# Patient Record
Sex: FEMALE | Race: BLACK OR AFRICAN AMERICAN | NOT HISPANIC OR LATINO | Employment: OTHER | URBAN - METROPOLITAN AREA
[De-identification: names, ages, dates, MRNs, and addresses within clinical notes are randomized per-mention and may not be internally consistent; named-entity substitution may affect disease eponyms.]

---

## 2024-09-23 ENCOUNTER — APPOINTMENT (OUTPATIENT)
Dept: PHYSICAL THERAPY | Facility: CLINIC | Age: 64
End: 2024-09-23
Payer: MEDICARE

## 2024-09-30 ENCOUNTER — EVALUATION (OUTPATIENT)
Dept: PHYSICAL THERAPY | Facility: CLINIC | Age: 64
End: 2024-09-30
Payer: MEDICARE

## 2024-09-30 DIAGNOSIS — M51.34 DDD (DEGENERATIVE DISC DISEASE), THORACIC: Primary | ICD-10-CM

## 2024-09-30 PROCEDURE — 97161 PT EVAL LOW COMPLEX 20 MIN: CPT | Mod: GP | Performed by: PHYSICAL THERAPIST

## 2024-09-30 PROCEDURE — 97535 SELF CARE MNGMENT TRAINING: CPT | Mod: GP | Performed by: PHYSICAL THERAPIST

## 2024-09-30 ASSESSMENT — ENCOUNTER SYMPTOMS
LOSS OF SENSATION IN FEET: 0
OCCASIONAL FEELINGS OF UNSTEADINESS: 0
DEPRESSION: 0

## 2024-09-30 ASSESSMENT — COLUMBIA-SUICIDE SEVERITY RATING SCALE - C-SSRS
6. HAVE YOU EVER DONE ANYTHING, STARTED TO DO ANYTHING, OR PREPARED TO DO ANYTHING TO END YOUR LIFE?: NO
2. HAVE YOU ACTUALLY HAD ANY THOUGHTS OF KILLING YOURSELF?: NO
1. IN THE PAST MONTH, HAVE YOU WISHED YOU WERE DEAD OR WISHED YOU COULD GO TO SLEEP AND NOT WAKE UP?: NO

## 2024-09-30 NOTE — PROGRESS NOTES
Physical Therapy Evaluation    Patient Name: Agustina Leal  MRN: [id number]  Today's Date: 9/30/2024  Referred by: Dr. Orellana  Visit: 1/MN      Time Calculation  Start Time: 1145  Stop Time: 1225  Time Calculation (min): 40 min  Diagnosis:  1. 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  Pain:  0-10/10 mid thoracic spine  Home Living:  No issues  Prior level of function:  History of neck and low back/hip issues  Personal Factors Impacting Care:  Right shoulder RCT    OBJECTIVE:  ROM: full shoulder and thoracic ROM  + TTP mid thoracic spine and paraspinals  Shoulder strength: right 4-/5, left 5/5  Functional Outcome Measure:  Oswsestry 46%    ASSESSMENT:  Patient presents with signs of aggravated thoracic spine degeneration, weakness noted and patient now with bigger demand on musculature with new job duties, will benefit from progressive strengthening program to address deficits and lessen stress on thoracic spine.    Patient presents with the following deficits/problems that indicate the need for skilled PT: thoracic pain, UE weakness.    Low complexity due to patient's clinical presentation being stable and uncomplicated by any significant comorbidities that may affect rehab tolerance and progression.     Clinical presentation:  Stable and/or uncomplicated characteristics    TREATMENT:  Initial evaluation performed followed by discussion of findings and instruction in HEP.    PATIENT EDUCATION:  Access Code: [email or web address]  URL: [email or web address]  Date:  09/30/2024  Prepared by: Stephen Jones    Exercises  - Standing Row with Anchored Resistance  - 1 x daily - 7 x weekly - 3 sets - 15 reps  - Shoulder extension with resistance - Neutral  - 1 x daily - 7 x weekly - 3 sets - 15 reps  - Bird Dog  - 1 x daily - 7 x weekly - 3 sets - 10 reps  - Standing Shoulder Flexion to 90 Degrees with Dumbbells  - 1 x daily - 7 x weekly - 3 sets - 15 reps    PLAN:   HEP daily, will reach out via email with updates     Rehab potential:  Good  Plan of care agreement  Patient    GOALS:  Active       PT Problem       Independent with thoracic and shoulder strengthening program       Start:  09/30/24    Expected End:  11/25/24            Patient Stated Goal: lessen thoracic pain       Start:  09/30/24    Expected End:  11/25/24

## 2024-10-14 ENCOUNTER — APPOINTMENT (OUTPATIENT)
Dept: PHYSICAL THERAPY | Facility: CLINIC | Age: 64
End: 2024-10-14
Payer: MEDICARE

## 2024-10-16 ENCOUNTER — OFFICE VISIT (OUTPATIENT)
Dept: URGENT CARE | Age: 64
End: 2024-10-16
Payer: MEDICARE

## 2024-10-16 VITALS
WEIGHT: 179 LBS | TEMPERATURE: 97.7 F | RESPIRATION RATE: 14 BRPM | HEART RATE: 65 BPM | OXYGEN SATURATION: 96 % | DIASTOLIC BLOOD PRESSURE: 81 MMHG | SYSTOLIC BLOOD PRESSURE: 123 MMHG | BODY MASS INDEX: 33.82 KG/M2

## 2024-10-16 DIAGNOSIS — J01.00 ACUTE NON-RECURRENT MAXILLARY SINUSITIS: Primary | ICD-10-CM

## 2024-10-16 PROCEDURE — 99070 SPECIAL SUPPLIES PHYS/QHP: CPT | Performed by: PHYSICIAN ASSISTANT

## 2024-10-16 PROCEDURE — 99213 OFFICE O/P EST LOW 20 MIN: CPT | Performed by: PHYSICIAN ASSISTANT

## 2024-10-16 RX ORDER — AMOXICILLIN AND CLAVULANATE POTASSIUM 875; 125 MG/1; MG/1
1 TABLET, FILM COATED ORAL 2 TIMES DAILY
Qty: 20 TABLET | Refills: 0 | Status: SHIPPED | OUTPATIENT
Start: 2024-10-16 | End: 2024-10-26

## 2024-10-16 NOTE — PROGRESS NOTES
Subjective   Patient ID: Agustina Leal is a 63 y.o. female. They present today with a chief complaint of No chief complaint on file..    History of Present Illness  HPI  Patient presents with sinus symptoms for the past 14 days.  Complains of facial pressure, congestion, aches.  No fever.  No purulent rhinorrhea.  None remitting course.  Does have associated right ear fullness.    Past Medical History  Allergies as of 10/16/2024    (Not on File)       (Not in a hospital admission)             Past Medical History:   Diagnosis Date    Acute maxillary sinusitis, unspecified 06/08/2022    Acute maxillary sinusitis    Contusion of unspecified hand, initial encounter 07/06/2018    Hand contusion    Encounter for screening for depression 07/26/2022    Standardized adult depression screening tool completed    Personal history of other diseases of the respiratory system 12/26/2017    History of acute sinusitis    Personal history of other diseases of the respiratory system 03/01/2022    History of acute bronchitis with bronchospasm       Past Surgical History:   Procedure Laterality Date    HAND SURGERY  05/22/2018    Hand Surgery                                                                                                                                                                  Review of Systems  Review of Systems   Review of systems: A complete review of systems was done, and is as stated in the history of present illness, is otherwise negative or not pertinent to the complaint.       Objective    There were no vitals filed for this visit.  No LMP recorded.    Physical Exam  NAD. Well appearing    MMM   PERRLA   no icterus   TMs clear bilat   Oropharynx clear of exudate or tonsillar swelling/exudate   + right maxillary sinus ttp   neck supple. TIMMY. No LAD   no resp distress. Lungs CTAB without w/r/r   RRR. No m/r/g   Abd; Soft. NTTP   BURROWS x 4. MS 5/5   Skin; warm without rashes   pulses 2+ throughout    neuro intact with normal sensation and motor   psych a&o x 3       Procedures    Point of Care Test & Imaging Results from this visit    Assessment/Plan   Allergies, medications, history, and pertinent labs/EKGs/Imaging reviewed by Mike Harper PA-C.     Medical Decision Making  -ABSI  -Augmentin course; previously tolerated  -fu pcp 2-3 days if not improved  -all ?s answered     Orders and Diagnoses  There are no diagnoses linked to this encounter.

## 2024-12-05 ENCOUNTER — OFFICE VISIT (OUTPATIENT)
Dept: URGENT CARE | Age: 64
End: 2024-12-05
Payer: MEDICARE

## 2024-12-05 VITALS — SYSTOLIC BLOOD PRESSURE: 167 MMHG | DIASTOLIC BLOOD PRESSURE: 91 MMHG | HEART RATE: 86 BPM | TEMPERATURE: 97.9 F

## 2024-12-05 DIAGNOSIS — M54.9 RIGHT-SIDED BACK PAIN, UNSPECIFIED BACK LOCATION, UNSPECIFIED CHRONICITY: Primary | ICD-10-CM

## 2024-12-05 LAB
POC APPEARANCE, URINE: CLEAR
POC BILIRUBIN, URINE: NEGATIVE
POC BLOOD, URINE: ABNORMAL
POC COLOR, URINE: YELLOW
POC GLUCOSE, URINE: NEGATIVE MG/DL
POC KETONES, URINE: NEGATIVE MG/DL
POC LEUKOCYTES, URINE: NEGATIVE
POC NITRITE,URINE: NEGATIVE
POC PH, URINE: 6 PH
POC PROTEIN, URINE: NEGATIVE MG/DL
POC SPECIFIC GRAVITY, URINE: >=1.03

## 2024-12-05 RX ORDER — METHOCARBAMOL 500 MG/1
500 TABLET, FILM COATED ORAL 4 TIMES DAILY PRN
Qty: 40 TABLET | Refills: 0 | Status: SHIPPED | OUTPATIENT
Start: 2024-12-05 | End: 2025-02-03

## 2024-12-05 NOTE — PROGRESS NOTES
Subjective   Patient ID: Agustina Leal is a 64 y.o. female. They present today with a chief complaint of Back Pain (R side pain x2 days. Hx kidney stone Mar 2024- Sx removal).    History of Present Illness  65 yo female coming in for right side pain for the last 2 days. 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 She states when she pushes on the area of pain she does get some relief. She denies any urinary issues but states she has had a kidney stone in the past. She denies any other complaints.     Past Medical History  Allergies as of 12/05/2024 - Reviewed 12/05/2024   Allergen Reaction Noted    Bacitracin Rash 02/18/2013    Celecoxib GI Upset 11/27/2013    Cephalexin Other and Rash 04/23/2010    Codeine GI Upset, Other, and Nausea/vomiting 01/22/2009    Fluoxetine Rash 05/15/2003    Meloxicam Hives, Swelling, and Unknown 11/27/2013    Neomycin Rash 02/18/2013    Nickel Hives 12/27/2012    Tramadol Hives and Unknown 11/27/2013    Lincomycin GI Upset 10/16/2024    Oxycodone-acetaminophen Hives 10/16/2024    Tamsulosin Unknown 03/27/2024    Latex Rash 10/16/2024       (Not in a hospital admission)       Past Medical History:   Diagnosis Date    Acute maxillary sinusitis, unspecified 06/08/2022    Acute maxillary sinusitis    Contusion of unspecified hand, initial encounter 07/06/2018    Hand contusion    Encounter for screening for depression 07/26/2022    Standardized adult depression screening tool completed    Personal history of other diseases of the respiratory system 12/26/2017    History of acute sinusitis    Personal history of other diseases of the respiratory system 03/01/2022    History of acute bronchitis with bronchospasm       Past Surgical History:   Procedure Laterality Date    HAND SURGERY  05/22/2018    Hand Surgery                                                                                                                                                                   Review of Systems  Review of Systems:  General: No weight loss, fatigue, anorexia, insomnia, fever, chills.  Cardiac: No chest pain, palpitations, syncope, near syncope.  Pulmonary:  No shortness of breath, cough, hemoptysis  Heme/lymph: No swollen glands, fever, bleeding  GI: Positive right flank abdominal pain, no change in bowel habits, melena, hematemesis, hematochezia, nausea, vomiting, or diarrhea  : No discharge, dysuria, frequency, urgency, hematuria  Neuro: No numbness, tingling, headaches                                 Objective    Vitals:    12/05/24 0921   BP: (!) 167/91   BP Location: Left arm   Patient Position: Sitting   BP Cuff Size: Adult   Pulse: 86   Temp: 36.6 °C (97.9 °F)   TempSrc: Oral     No LMP recorded.    Physical Exam  Physical Exam:  General: Vital noted, no distress. Afebrile  Cardiac: Regular rate and rhythm, no murmur  Pulmonary: Lungs clear bilaterally with good aeration. No adventitious breath sounds.  Abdomen: Soft, There is mild tenderness on palpation to the right flank, nonsurgical. No peritoneal signs. Normoactive bowel sounds.  No CVA tenderness  Musculoskeletal: No peripheral edema.   Skin: No rashes  Neuro: No focal neurologic deficits    Procedures    Point of Care Test & Imaging Results from this visit  Results for orders placed or performed in visit on 12/05/24   POCT UA Automated manually resulted   Result Value Ref Range    POC Color, Urine Yellow Straw, Yellow, Light-Yellow    POC Appearance, Urine Clear Clear    POC Glucose, Urine NEGATIVE NEGATIVE mg/dl    POC Bilirubin, Urine NEGATIVE NEGATIVE    POC Ketones, Urine NEGATIVE NEGATIVE mg/dl    POC Specific Gravity, Urine >=1.030 1.005 - 1.035    POC Blood, Urine SMALL (1+) (A) NEGATIVE    POC PH, Urine 6.0 No Reference Range Established PH    POC Protein, Urine NEGATIVE NEGATIVE, 30 (1+) mg/dl    Poc Nitrite, Urine NEGATIVE NEGATIVE    POC Leukocytes, Urine NEGATIVE  NEGATIVE      No results found.    Diagnostic study results (if any) were reviewed by STARR Smith.    Assessment/Plan   Allergies, medications, history, and pertinent labs/EKGs/Imaging reviewed by STARR Smith.     Medical Decision Making  Testing: UA  Treatment: Methocarbamol and   Differential: 1) muscle strain, 2) kidney stone , 3) flank pain  Plan: Patient will follow up with the PCP in the next 2-3 days. Return for any worsening symptoms or go to the ER for further evaluation. Patient understands return precautions and discharge insturctions.  Impression:   1) muscle strain      Orders and Diagnoses  Diagnoses and all orders for this visit:  Right-sided back pain, unspecified back location, unspecified chronicity  -     POCT UA Automated manually resulted  -     methocarbamol (Robaxin) 500 mg tablet; Take 1 tablet (500 mg) by mouth 4 times a day as needed for muscle spasms.      Medical Admin Record      Patient disposition: Home    Electronically signed by STARR Smith  9:54 AM       Full range of motion of upper and lower extremities, no joint tenderness/swelling.